# Patient Record
Sex: FEMALE | Race: BLACK OR AFRICAN AMERICAN | NOT HISPANIC OR LATINO | URBAN - NONMETROPOLITAN AREA
[De-identification: names, ages, dates, MRNs, and addresses within clinical notes are randomized per-mention and may not be internally consistent; named-entity substitution may affect disease eponyms.]

---

## 2017-06-22 PROBLEM — H52.223: Noted: 2017-06-22

## 2017-06-22 PROBLEM — H52.13: Noted: 2017-06-22

## 2017-06-22 PROBLEM — H10.423: Noted: 2017-06-22

## 2019-03-22 ENCOUNTER — IMPORTED ENCOUNTER (OUTPATIENT)
Dept: URBAN - NONMETROPOLITAN AREA CLINIC 1 | Facility: CLINIC | Age: 44
End: 2019-03-22

## 2019-03-22 PROCEDURE — 92014 COMPRE OPH EXAM EST PT 1/>: CPT

## 2019-03-22 PROCEDURE — 92015 DETERMINE REFRACTIVE STATE: CPT

## 2019-03-22 PROCEDURE — 92310 CONTACT LENS FITTING OU: CPT

## 2019-03-22 PROCEDURE — V2521 CNTCT LENS HYDROPHILIC TORIC: HCPCS

## 2019-03-22 NOTE — PATIENT DISCUSSION
Myopia/Astigmatism OUDiscussed refractive status in detail with patient. New glasses and contact Rx given today. Discussed proper care replacement and hygiene. Continue to monitor. Mild Ocular Allergies OU w/ Sinus issuesDiscussed findings in detail w/ pt todaySigns/symptoms associated discussedRecommend Pazeo QD OU Rx sent to pharmacy Continue to monitor.

## 2020-06-12 ENCOUNTER — IMPORTED ENCOUNTER (OUTPATIENT)
Dept: URBAN - NONMETROPOLITAN AREA CLINIC 1 | Facility: CLINIC | Age: 45
End: 2020-06-12

## 2020-06-12 PROCEDURE — 92015 DETERMINE REFRACTIVE STATE: CPT

## 2020-06-12 PROCEDURE — 92014 COMPRE OPH EXAM EST PT 1/>: CPT

## 2020-06-12 PROCEDURE — 92310 CONTACT LENS FITTING OU: CPT

## 2021-06-14 ENCOUNTER — IMPORTED ENCOUNTER (OUTPATIENT)
Dept: URBAN - NONMETROPOLITAN AREA CLINIC 1 | Facility: CLINIC | Age: 46
End: 2021-06-14

## 2021-06-14 PROCEDURE — 92014 COMPRE OPH EXAM EST PT 1/>: CPT

## 2021-06-14 PROCEDURE — 92310 CONTACT LENS FITTING OU: CPT

## 2021-06-14 PROCEDURE — 92015 DETERMINE REFRACTIVE STATE: CPT

## 2021-11-12 ENCOUNTER — IMPORTED ENCOUNTER (OUTPATIENT)
Dept: URBAN - NONMETROPOLITAN AREA CLINIC 1 | Facility: CLINIC | Age: 46
End: 2021-11-12

## 2021-11-12 PROBLEM — H52.223: Noted: 2021-11-12

## 2021-11-12 PROBLEM — H10.423: Noted: 2021-11-12

## 2021-11-12 PROBLEM — H52.13: Noted: 2021-11-12

## 2021-11-12 PROCEDURE — 99213 OFFICE O/P EST LOW 20 MIN: CPT

## 2021-11-12 NOTE — PATIENT DISCUSSION
Allergic Conjunctivitis OU Discussed diagnosis in detail with patient Injection and papillae noted today OU Start Lotemax SM BID x 2 weeks RX sent to pharmacy Handwritten RX for Prednisolone given today in case Lotemax is to exspensive with coupon Continue to monitor PREVIOUS NOTES Myopia/Astigmatism OUDiscussed refractive status in detail with patient. New glasses and contact Rx given today. Discussed proper care replacement and hygiene. Continue to monitor.

## 2022-04-09 ASSESSMENT — VISUAL ACUITY
OS_CC: 20/20
OS_SC: 20/25-
OD_CC: 20/20
OS_SC: 20/20
OD_SC: 20/30-
OD_SC: 20/20
OS_SC: 20/20
OD_SC: 20/20-1

## 2022-04-09 ASSESSMENT — TONOMETRY
OS_IOP_MMHG: 15
OS_IOP_MMHG: 14
OD_IOP_MMHG: 12
OS_IOP_MMHG: 13
OD_IOP_MMHG: 14
OS_IOP_MMHG: 14
OD_IOP_MMHG: 14
OD_IOP_MMHG: 14